# Patient Record
Sex: FEMALE | Race: WHITE | NOT HISPANIC OR LATINO
[De-identification: names, ages, dates, MRNs, and addresses within clinical notes are randomized per-mention and may not be internally consistent; named-entity substitution may affect disease eponyms.]

---

## 2020-12-24 ENCOUNTER — ASOB RESULT (OUTPATIENT)
Age: 30
End: 2020-12-24

## 2020-12-24 ENCOUNTER — APPOINTMENT (OUTPATIENT)
Dept: ANTEPARTUM | Facility: CLINIC | Age: 30
End: 2020-12-24
Payer: COMMERCIAL

## 2020-12-24 ENCOUNTER — LABORATORY RESULT (OUTPATIENT)
Age: 30
End: 2020-12-24

## 2020-12-24 PROCEDURE — 36416 COLLJ CAPILLARY BLOOD SPEC: CPT

## 2020-12-24 PROCEDURE — 99072 ADDL SUPL MATRL&STAF TM PHE: CPT

## 2020-12-24 PROCEDURE — 76813 OB US NUCHAL MEAS 1 GEST: CPT | Mod: 59

## 2020-12-24 PROCEDURE — 76801 OB US < 14 WKS SINGLE FETUS: CPT

## 2021-02-16 ENCOUNTER — APPOINTMENT (OUTPATIENT)
Dept: ANTEPARTUM | Facility: CLINIC | Age: 31
End: 2021-02-16
Payer: COMMERCIAL

## 2021-02-16 ENCOUNTER — ASOB RESULT (OUTPATIENT)
Age: 31
End: 2021-02-16

## 2021-02-16 PROCEDURE — 76811 OB US DETAILED SNGL FETUS: CPT

## 2021-02-16 PROCEDURE — 99072 ADDL SUPL MATRL&STAF TM PHE: CPT

## 2021-06-09 ENCOUNTER — ASOB RESULT (OUTPATIENT)
Age: 31
End: 2021-06-09

## 2021-06-09 ENCOUNTER — APPOINTMENT (OUTPATIENT)
Dept: ANTEPARTUM | Facility: CLINIC | Age: 31
End: 2021-06-09
Payer: COMMERCIAL

## 2021-06-09 PROCEDURE — 76816 OB US FOLLOW-UP PER FETUS: CPT

## 2021-06-09 PROCEDURE — 76819 FETAL BIOPHYS PROFIL W/O NST: CPT

## 2021-06-18 ENCOUNTER — APPOINTMENT (OUTPATIENT)
Dept: ANTEPARTUM | Facility: CLINIC | Age: 31
End: 2021-06-18
Payer: COMMERCIAL

## 2021-06-18 ENCOUNTER — ASOB RESULT (OUTPATIENT)
Age: 31
End: 2021-06-18

## 2021-06-18 PROCEDURE — 76819 FETAL BIOPHYS PROFIL W/O NST: CPT

## 2021-06-28 ENCOUNTER — APPOINTMENT (OUTPATIENT)
Dept: ANTEPARTUM | Facility: CLINIC | Age: 31
End: 2021-06-28
Payer: COMMERCIAL

## 2021-06-28 ENCOUNTER — ASOB RESULT (OUTPATIENT)
Age: 31
End: 2021-06-28

## 2021-06-28 PROCEDURE — 76816 OB US FOLLOW-UP PER FETUS: CPT

## 2021-07-05 ENCOUNTER — OUTPATIENT (OUTPATIENT)
Dept: OUTPATIENT SERVICES | Facility: HOSPITAL | Age: 31
LOS: 1 days | End: 2021-07-05
Payer: COMMERCIAL

## 2021-07-05 VITALS — HEART RATE: 80 BPM | OXYGEN SATURATION: 96 %

## 2021-07-05 VITALS — SYSTOLIC BLOOD PRESSURE: 119 MMHG | DIASTOLIC BLOOD PRESSURE: 72 MMHG

## 2021-07-05 DIAGNOSIS — Z3A.00 WEEKS OF GESTATION OF PREGNANCY NOT SPECIFIED: ICD-10-CM

## 2021-07-05 DIAGNOSIS — E04.1 NONTOXIC SINGLE THYROID NODULE: Chronic | ICD-10-CM

## 2021-07-05 DIAGNOSIS — O26.899 OTHER SPECIFIED PREGNANCY RELATED CONDITIONS, UNSPECIFIED TRIMESTER: ICD-10-CM

## 2021-07-05 DIAGNOSIS — Z98.890 OTHER SPECIFIED POSTPROCEDURAL STATES: Chronic | ICD-10-CM

## 2021-07-05 PROCEDURE — 59025 FETAL NON-STRESS TEST: CPT

## 2021-07-05 PROCEDURE — G0463: CPT

## 2021-07-05 PROCEDURE — 99214 OFFICE O/P EST MOD 30 MIN: CPT

## 2021-07-05 PROCEDURE — 59025 FETAL NON-STRESS TEST: CPT | Mod: 26

## 2021-07-05 NOTE — OB PROVIDER TRIAGE NOTE - NSOBPROVIDERNOTE_OBGYN_ALL_OB_FT
32yo  @ 39w 6d with no evidence of ruptured membranes 32yo  @ 39w 6d with no evidence of ruptured membranes  not in labor  BSUS: vtx BPP 8/8, MILANA 11.8  Discussed case with Dr Conde, plan is to discharge patient home  Labor precautions  Daily FKC  increase hydration  F/u in office scheduled to schedule IOL  Yu Isidro PA-C

## 2021-07-05 NOTE — OB PROVIDER TRIAGE NOTE - HISTORY OF PRESENT ILLNESS
32yo  @ 39w 6 d presents c/o feeling wet this am when she woke up at 7am.  She changed her underwear and again felt her underwear wet, denies any large gush or leakage down her leg.  Denies VB or contractions, has + FM    PNC: Dr agarwal  PNI: uncomplicated  PNL: GBS +    All: NKDA  Meds: PNV  PMHx: Denies  PSHx: neck cyst removed, nasal septoplasty  Socialhx: Denies x 3  OBhx: 2016  FT    7lbs 12 oz  GYNhx: Denies fibroids, ov cysts or STDs    T(C): 37.2 (21 @ 10:22), Max: 37.2 (21 @ 10:06)  HR: 80 (21 @ 10:25) (80 - 90)  BP: 119/72 (21 @ 10:22) (119/72 - 119/72)  RR: 18 (21 @ 10:22) (18 - 18)  SpO2: 98% (21 @ 10:25) (97% - 99%)    Gen: NAd  Abdomen: Gravid, NT  Ext: no calf tenderness    NST:   TOCO  VE: 1-2/50/-3  SSE: neg pooling, neg nitrazine, neg ferning  BSUS:  EFW: 3600    A/P 32yo  @ 39w 6 d no evidence of ruptured membranes or labor at this time  - NST/TOCO  - BSUS  D/W Dr Elton Isidro PA-C 30yo  @ 39w 6 d presents c/o feeling wet this am when she woke up at 7am.  She changed her underwear and again felt her underwear wet, denies any large gush or leakage down her leg.  Denies VB or contractions, has + FM    PNC: Dr agarwal  PNI: uncomplicated  PNL: GBS +    All: NKDA  Meds: PNV  PMHx: Denies  PSHx: neck cyst removed, nasal septoplasty  Socialhx: Denies x 3  OBhx: 2016  FT    7lbs 12 oz  GYNhx: Denies fibroids, ov cysts or STDs    T(C): 37.2 (21 @ 10:22), Max: 37.2 (21 @ 10:06)  HR: 80 (21 @ 10:25) (80 - 90)  BP: 119/72 (21 @ 10:22) (119/72 - 119/72)  RR: 18 (21 @ 10:22) (18 - 18)  SpO2: 98% (21 @ 10:25) (97% - 99%)    Gen: NAd  Abdomen: Gravid, NT  Ext: no calf tenderness    NST: 125 moderate variability + accels no decels  TOCO: occasional  VE: 1-2/50/-3  SSE: neg pooling, neg nitrazine, neg ferning  BSUS: vtx, BPP 8/8, MILANA 11.8  EFW: 3600    A/P 30yo  @ 39w 6 d no evidence of ruptured membranes or labor at this time  - NST/TOCO  - BSUS  D/W Dr Elton Isidro PA-C 30yo  @ 39w 6 d presents c/o feeling wet this am when she woke up at 7am.  She changed her underwear and again felt her underwear wet, denies any large gush or leakage down her leg.  Denies VB or contractions, has + FM    PNC: Dr agarwal  PNI: uncomplicated  PNL: GBS +    All: NKDA  Meds: PNV  PMHx: Denies  PSHx: neck cyst removed, nasal septoplasty  Socialhx: Denies x 3  OBhx: 2016  FT    7lbs 12 oz  GYNhx: Denies fibroids, ov cysts or STDs    T(C): 37.2 (21 @ 10:22), Max: 37.2 (21 @ 10:06)  HR: 80 (21 @ 10:25) (80 - 90)  BP: 119/72 (21 @ 10:22) (119/72 - 119/72)  RR: 18 (21 @ 10:22) (18 - 18)  SpO2: 98% (21 @ 10:25) (97% - 99%)    Gen: NAD  Abdomen: Gravid, NT  Ext: no calf tenderness    NST: 125 moderate variability + accels no decels  TOCO: occasional  VE: 1-2/50/-3  SSE: neg pooling, neg nitrazine, neg ferning  BSUS: vtx, BPP 8/8, MILANA 11.8  EFW: 3600    A/P 30yo  @ 39w 6 d no evidence of ruptured membranes or labor at this time  - NST/TOCO  - BSUS  D/W Dr Elton Isidro PA-C

## 2021-07-05 NOTE — OB PROVIDER TRIAGE NOTE - NS_PHYSICALALLNEG_OBGYN_ALL_OB
No changes to her original H&P. Patient's diagnosis was again reviewed. All risks, benefits, complications and prognosis were discussed. Informed consent was obtained.  
All other review of systems negative, except as noted in HPI

## 2021-07-07 ENCOUNTER — INPATIENT (INPATIENT)
Facility: HOSPITAL | Age: 31
LOS: 1 days | Discharge: HOME | End: 2021-07-09
Attending: OBSTETRICS & GYNECOLOGY | Admitting: OBSTETRICS & GYNECOLOGY
Payer: COMMERCIAL

## 2021-07-07 VITALS — OXYGEN SATURATION: 99 % | HEART RATE: 75 BPM

## 2021-07-07 DIAGNOSIS — Z3A.00 WEEKS OF GESTATION OF PREGNANCY NOT SPECIFIED: ICD-10-CM

## 2021-07-07 DIAGNOSIS — O26.899 OTHER SPECIFIED PREGNANCY RELATED CONDITIONS, UNSPECIFIED TRIMESTER: ICD-10-CM

## 2021-07-07 DIAGNOSIS — Z98.890 OTHER SPECIFIED POSTPROCEDURAL STATES: Chronic | ICD-10-CM

## 2021-07-07 DIAGNOSIS — E04.1 NONTOXIC SINGLE THYROID NODULE: Chronic | ICD-10-CM

## 2021-07-07 DIAGNOSIS — Z34.80 ENCOUNTER FOR SUPERVISION OF OTHER NORMAL PREGNANCY, UNSPECIFIED TRIMESTER: ICD-10-CM

## 2021-07-07 DIAGNOSIS — O42.90 PREMATURE RUPTURE OF MEMBRANES, UNSPECIFIED AS TO LENGTH OF TIME BETWEEN RUPTURE AND ONSET OF LABOR, UNSPECIFIED WEEKS OF GESTATION: ICD-10-CM

## 2021-07-07 LAB
AMNISURE ROM (RUPTURE OF MEMBRANES): POSITIVE
BASOPHILS # BLD AUTO: 0.04 K/UL — SIGNIFICANT CHANGE UP (ref 0–0.2)
BASOPHILS NFR BLD AUTO: 0.3 % — SIGNIFICANT CHANGE UP (ref 0–2)
COVID-19 SPIKE DOMAIN AB INTERP: NEGATIVE — SIGNIFICANT CHANGE UP
COVID-19 SPIKE DOMAIN ANTIBODY RESULT: 0.4 U/ML — SIGNIFICANT CHANGE UP
EOSINOPHIL # BLD AUTO: 0.09 K/UL — SIGNIFICANT CHANGE UP (ref 0–0.5)
EOSINOPHIL NFR BLD AUTO: 0.8 % — SIGNIFICANT CHANGE UP (ref 0–6)
HCT VFR BLD CALC: 37 % — SIGNIFICANT CHANGE UP (ref 34.5–45)
HGB BLD-MCNC: 11.7 G/DL — SIGNIFICANT CHANGE UP (ref 11.5–15.5)
IMM GRANULOCYTES NFR BLD AUTO: 1 % — SIGNIFICANT CHANGE UP (ref 0–1.5)
LYMPHOCYTES # BLD AUTO: 1.99 K/UL — SIGNIFICANT CHANGE UP (ref 1–3.3)
LYMPHOCYTES # BLD AUTO: 17.3 % — SIGNIFICANT CHANGE UP (ref 13–44)
MCHC RBC-ENTMCNC: 26 PG — LOW (ref 27–34)
MCHC RBC-ENTMCNC: 31.6 GM/DL — LOW (ref 32–36)
MCV RBC AUTO: 82.2 FL — SIGNIFICANT CHANGE UP (ref 80–100)
MONOCYTES # BLD AUTO: 0.89 K/UL — SIGNIFICANT CHANGE UP (ref 0–0.9)
MONOCYTES NFR BLD AUTO: 7.7 % — SIGNIFICANT CHANGE UP (ref 2–14)
NEUTROPHILS # BLD AUTO: 8.38 K/UL — HIGH (ref 1.8–7.4)
NEUTROPHILS NFR BLD AUTO: 72.9 % — SIGNIFICANT CHANGE UP (ref 43–77)
NRBC # BLD: 0 /100 WBCS — SIGNIFICANT CHANGE UP (ref 0–0)
PLATELET # BLD AUTO: 236 K/UL — SIGNIFICANT CHANGE UP (ref 150–400)
RBC # BLD: 4.5 M/UL — SIGNIFICANT CHANGE UP (ref 3.8–5.2)
RBC # FLD: 14 % — SIGNIFICANT CHANGE UP (ref 10.3–14.5)
SARS-COV-2 IGG+IGM SERPL QL IA: 0.4 U/ML — SIGNIFICANT CHANGE UP
SARS-COV-2 IGG+IGM SERPL QL IA: NEGATIVE — SIGNIFICANT CHANGE UP
SARS-COV-2 RNA SPEC QL NAA+PROBE: SIGNIFICANT CHANGE UP
T PALLIDUM AB TITR SER: NEGATIVE — SIGNIFICANT CHANGE UP
WBC # BLD: 11.51 K/UL — HIGH (ref 3.8–10.5)
WBC # FLD AUTO: 11.51 K/UL — HIGH (ref 3.8–10.5)

## 2021-07-07 RX ORDER — HYDROCORTISONE 1 %
1 OINTMENT (GRAM) TOPICAL EVERY 6 HOURS
Refills: 0 | Status: DISCONTINUED | OUTPATIENT
Start: 2021-07-07 | End: 2021-07-09

## 2021-07-07 RX ORDER — PRAMOXINE HYDROCHLORIDE 150 MG/15G
1 AEROSOL, FOAM RECTAL EVERY 4 HOURS
Refills: 0 | Status: DISCONTINUED | OUTPATIENT
Start: 2021-07-07 | End: 2021-07-09

## 2021-07-07 RX ORDER — BENZOCAINE 10 %
1 GEL (GRAM) MUCOUS MEMBRANE EVERY 6 HOURS
Refills: 0 | Status: DISCONTINUED | OUTPATIENT
Start: 2021-07-07 | End: 2021-07-09

## 2021-07-07 RX ORDER — AER TRAVELER 0.5 G/1
1 SOLUTION RECTAL; TOPICAL EVERY 4 HOURS
Refills: 0 | Status: DISCONTINUED | OUTPATIENT
Start: 2021-07-07 | End: 2021-07-09

## 2021-07-07 RX ORDER — SODIUM CHLORIDE 9 MG/ML
1000 INJECTION, SOLUTION INTRAVENOUS
Refills: 0 | Status: DISCONTINUED | OUTPATIENT
Start: 2021-07-07 | End: 2021-07-07

## 2021-07-07 RX ORDER — LANOLIN
1 OINTMENT (GRAM) TOPICAL EVERY 6 HOURS
Refills: 0 | Status: DISCONTINUED | OUTPATIENT
Start: 2021-07-07 | End: 2021-07-09

## 2021-07-07 RX ORDER — TETANUS TOXOID, REDUCED DIPHTHERIA TOXOID AND ACELLULAR PERTUSSIS VACCINE, ADSORBED 5; 2.5; 8; 8; 2.5 [IU]/.5ML; [IU]/.5ML; UG/.5ML; UG/.5ML; UG/.5ML
0.5 SUSPENSION INTRAMUSCULAR ONCE
Refills: 0 | Status: DISCONTINUED | OUTPATIENT
Start: 2021-07-07 | End: 2021-07-09

## 2021-07-07 RX ORDER — KETOROLAC TROMETHAMINE 30 MG/ML
30 SYRINGE (ML) INJECTION ONCE
Refills: 0 | Status: DISCONTINUED | OUTPATIENT
Start: 2021-07-07 | End: 2021-07-07

## 2021-07-07 RX ORDER — DIPHENHYDRAMINE HCL 50 MG
25 CAPSULE ORAL EVERY 6 HOURS
Refills: 0 | Status: DISCONTINUED | OUTPATIENT
Start: 2021-07-07 | End: 2021-07-09

## 2021-07-07 RX ORDER — OXYTOCIN 10 UNIT/ML
333.33 VIAL (ML) INJECTION
Qty: 20 | Refills: 0 | Status: DISCONTINUED | OUTPATIENT
Start: 2021-07-07 | End: 2021-07-07

## 2021-07-07 RX ORDER — AMPICILLIN TRIHYDRATE 250 MG
1 CAPSULE ORAL EVERY 4 HOURS
Refills: 0 | Status: DISCONTINUED | OUTPATIENT
Start: 2021-07-07 | End: 2021-07-07

## 2021-07-07 RX ORDER — OXYCODONE HYDROCHLORIDE 5 MG/1
5 TABLET ORAL
Refills: 0 | Status: DISCONTINUED | OUTPATIENT
Start: 2021-07-07 | End: 2021-07-09

## 2021-07-07 RX ORDER — MAGNESIUM HYDROXIDE 400 MG/1
30 TABLET, CHEWABLE ORAL
Refills: 0 | Status: DISCONTINUED | OUTPATIENT
Start: 2021-07-07 | End: 2021-07-09

## 2021-07-07 RX ORDER — IBUPROFEN 200 MG
600 TABLET ORAL EVERY 6 HOURS
Refills: 0 | Status: COMPLETED | OUTPATIENT
Start: 2021-07-07 | End: 2022-06-05

## 2021-07-07 RX ORDER — ACETAMINOPHEN 500 MG
975 TABLET ORAL
Refills: 0 | Status: DISCONTINUED | OUTPATIENT
Start: 2021-07-07 | End: 2021-07-09

## 2021-07-07 RX ORDER — SIMETHICONE 80 MG/1
80 TABLET, CHEWABLE ORAL EVERY 4 HOURS
Refills: 0 | Status: DISCONTINUED | OUTPATIENT
Start: 2021-07-07 | End: 2021-07-09

## 2021-07-07 RX ORDER — OXYTOCIN 10 UNIT/ML
333.33 VIAL (ML) INJECTION
Qty: 20 | Refills: 0 | Status: DISCONTINUED | OUTPATIENT
Start: 2021-07-07 | End: 2021-07-09

## 2021-07-07 RX ORDER — AMPICILLIN TRIHYDRATE 250 MG
2 CAPSULE ORAL ONCE
Refills: 0 | Status: COMPLETED | OUTPATIENT
Start: 2021-07-07 | End: 2021-07-07

## 2021-07-07 RX ORDER — CITRIC ACID/SODIUM CITRATE 300-500 MG
15 SOLUTION, ORAL ORAL EVERY 6 HOURS
Refills: 0 | Status: DISCONTINUED | OUTPATIENT
Start: 2021-07-07 | End: 2021-07-07

## 2021-07-07 RX ORDER — SODIUM CHLORIDE 9 MG/ML
3 INJECTION INTRAMUSCULAR; INTRAVENOUS; SUBCUTANEOUS EVERY 8 HOURS
Refills: 0 | Status: DISCONTINUED | OUTPATIENT
Start: 2021-07-07 | End: 2021-07-09

## 2021-07-07 RX ORDER — DIBUCAINE 1 %
1 OINTMENT (GRAM) RECTAL EVERY 6 HOURS
Refills: 0 | Status: DISCONTINUED | OUTPATIENT
Start: 2021-07-07 | End: 2021-07-09

## 2021-07-07 RX ORDER — OXYCODONE HYDROCHLORIDE 5 MG/1
5 TABLET ORAL ONCE
Refills: 0 | Status: DISCONTINUED | OUTPATIENT
Start: 2021-07-07 | End: 2021-07-09

## 2021-07-07 RX ADMIN — Medication 108 GRAM(S): at 10:58

## 2021-07-07 RX ADMIN — Medication 30 MILLIGRAM(S): at 22:48

## 2021-07-07 RX ADMIN — Medication 216 GRAM(S): at 06:48

## 2021-07-07 RX ADMIN — Medication 108 GRAM(S): at 19:20

## 2021-07-07 RX ADMIN — Medication 108 GRAM(S): at 14:48

## 2021-07-07 NOTE — OB PROVIDER H&P - PROBLEM SELECTOR PLAN 1
Admit  Routine Labs/ COVID PCR  cont efm/toco  PO cytotec induction  Ampicillin for GBS ppx.  Expectant .  d/w Dr. Nunez.  Miya Peres

## 2021-07-07 NOTE — OB PROVIDER DELIVERY SUMMARY - NSSELHIDDEN_OBGYN_ALL_OB_FT
[NS_DeliveryAttending1_OBGYN_ALL_OB_FT:[email or web address]=],[NS_DeliveryAssist1_OBGYN_ALL_OB_FT:[id number]=]

## 2021-07-07 NOTE — OB PROVIDER TRIAGE NOTE - HISTORY OF PRESENT ILLNESS
Admission H&P    Subjective  HPI: 32yo  40+1 presents for chief concern of leakage of fluid at 3am. Patient states she was asleep and awoke from sleep due to wetness. She had leaked clear fluid and had to change her underwear and bedsheets. However, she has not had any more leaking since then. Patient was seen in triage 2 days ago for r/o ROM, but was found not to be ruptured and thus discharged home with precautions.     +FM. -CTXs. -VB. Pt denies any other concerns.    GBS: positive  All: NKDA  Meds: PNV  PMHx: Denies  PSHx: thyroglossal duct cyst removed, nasal septoplasty  Socialhx: Denies t/e/d  OBhx: 2016  FT    7lbs 12 oz  GYNhx: Denies fibroids, ov cysts or STDs Admission H&P    Subjective  HPI: 30yo  40+1 presents for chief concern of leakage of fluid at 3am. Patient states she was asleep and awoke from sleep due to wetness. She had leaked clear fluid and had to change her underwear and bedsheets. However, she has not had any more leaking since then. Patient was seen in triage 2 days ago for r/o ROM, but was found not to be ruptured and thus discharged home with precautions.     +FM. -CTXs. -VB. Pt denies any other concerns.    EFW: 3770g  GBS: positive  All: NKDA  Meds: PNV  PMHx: Denies  PSHx: thyroglossal duct cyst removed, nasal septoplasty  Socialhx: Denies t/e/d  OBhx: 2016  FT    7lbs 12 oz  GYNhx: Denies fibroids, ov cysts or STDs

## 2021-07-07 NOTE — OB PROVIDER H&P - HISTORY OF PRESENT ILLNESS
Admission H&P    Subjective  HPI: 30yo  40+1 presents for chief concern of leakage of fluid at 3am. Patient states she was asleep and awoke from sleep due to wetness. She had leaked clear fluid and had to change her underwear and bedsheets. However, she has not had any more leaking since then. Patient was seen in triage 2 days ago for r/o ROM, but was found not to be ruptured and thus discharged home with precautions.     +FM. -CTXs. -VB. Pt denies any other concerns.    GBS: positive  All: NKDA  Meds: PNV  PMHx: Denies  PSHx: thyroglossal duct cyst removed, nasal septoplasty  Socialhx: Denies t/e/d  OBhx: 2016  FT    7lbs 12 oz  GYNhx: Denies fibroids, ov cysts or STDs

## 2021-07-07 NOTE — OB RN DELIVERY SUMMARY - NS_SEPSISRSKCALC_OBGYN_ALL_OB_FT
EOS calculated successfully. EOS Risk Factor: 0.5/1000 live births ([facility] national incidence); GA=40w1d; Temp=99.14; ROM=18.067; GBS='Positive'; Antibiotics='Broad spectrum antibiotics > 4 hrs prior to birth'

## 2021-07-07 NOTE — OB PROVIDER H&P - NSHPPHYSICALEXAM_GEN_ALL_CORE
ICU Vital Signs Last 24 Hrs  T(C): 36.6 (07 Jul 2021 06:34), Max: 36.9 (07 Jul 2021 04:28)  T(F): 97.9 (07 Jul 2021 06:34), Max: 98.42 (07 Jul 2021 04:28)  HR: 81 (07 Jul 2021 06:49) (70 - 89)  BP: 128/78 (07 Jul 2021 06:34) (112/77 - 128/78)  BP(mean): --  ABP: --  ABP(mean): --  RR: 18 (07 Jul 2021 06:34) (18 - 18)  SpO2: 100% (07 Jul 2021 06:49) (98% - 100%)  CV: RRR  Pulm: breathing comfortably on RA  Abd: gravid, nontender  Extr: moving all extremities with ease  – Spec: neg pooling, neg nitrazine, neg ferning, no bleeding  – VE: 2/50/-3  – FHT: baseline 120, mod variability, +accels, -decels  – [place]: irregular  – Sono: vertex, BPP 8/8, MILANA 10

## 2021-07-07 NOTE — OB PROVIDER TRIAGE NOTE - NSHPPHYSICALEXAM_GEN_ALL_CORE
Objective  Vital Signs Last 24 Hrs  T(C): 36.9 (07 Jul 2021 04:35), Max: 36.9 (07 Jul 2021 04:28)  T(F): 98.4 (07 Jul 2021 04:35), Max: 98.42 (07 Jul 2021 04:28)  HR: 79 (07 Jul 2021 05:35) (73 - 89)  BP: 112/77 (07 Jul 2021 04:35) (112/77 - 112/77)  RR: 18 (07 Jul 2021 04:35) (18 - 18)  SpO2: 99% (07 Jul 2021 05:35) (98% - 100%)    – PE:   CV: RRR  Pulm: breathing comfortably on RA  Abd: gravid, nontender  Extr: moving all extremities with ease  – Spec: neg pooling, neg nitrazine, neg ferning, no bleeding  – VE: 2/50/-3  – FHT: baseline 120, mod variability, +accels, -decels  – [place]: irregular  – Sono: vertex, BPP 8/8, MILANA 10

## 2021-07-07 NOTE — OB PROVIDER LABOR PROGRESS NOTE - ASSESSMENT
Have been following the patient all day.  PA notes appreciated.  IuPC replaced.  200 [place] units show adequate contraction.    Arrest of labor for 3 plus hours.     Discussed case with Dr. Goodson.  I recommend a c section for arrest of labor.  
Hold off pushng for now . will recheck in 20 minutes.

## 2021-07-07 NOTE — PRE-ANESTHESIA EVALUATION ADULT - NSANTHADDINFOFT_GEN_ALL_CORE
labor epidural explained to pt in detail;  risks include but not limited to HA, failure, nv injury.  All questions ansewred.

## 2021-07-07 NOTE — OB RN DELIVERY SUMMARY - NSSELHIDDEN_OBGYN_ALL_OB_FT
[NS_DeliveryAttending1_OBGYN_ALL_OB_FT:[email or web address]=],[NS_DeliveryAssist1_OBGYN_ALL_OB_FT:[id number]] [NS_DeliveryAttending1_OBGYN_ALL_OB_FT:[email or web address]=],[NS_DeliveryAssist1_OBGYN_ALL_OB_FT:[id number]=],[NS_DeliveryRN_OBGYN_ALL_OB_FT:[id number]=]

## 2021-07-07 NOTE — OB PROVIDER DELIVERY SUMMARY - DELIVERY COMPLICATIONS; SHOULDER DYSTOCIA
Right anterior shoulder was trapped laterally and moved to the right oblique in the pelvis before delivery.

## 2021-07-07 NOTE — OB PROVIDER TRIAGE NOTE - NSOBPROVIDERNOTE_OBGYN_ALL_OB_FT
Assessment  –  @40+1 presents for r/o ROM. Nitrazine indeterminate, ferning neg, pooling neg. MILANA wnl. No evidence of ROM. No prenatal issues. GBS positive.     Plan  - Patient stable for discharge home   - Return precautions discussed in detail: return to L&D if experiencing leakage of fluid, heavy vaginal bleeding, severely painful and regular contractions, or decreased fetal movement. Patient verbalized understanding. All questions answered thoroughly and to patient's satisfaction.   - Patient scheduled for routine prenatal visit and IOL planning on . Patient instructed to keep this appointment as scheduled.     d/w Dr. Enrique Colbert MD PGY1 Assessment  –  @40+1 presents for r/o ROM. Nitrazine indeterminate, ferning neg, pooling neg. MILANA wnl. No evidence of ROM. No prenatal issues. GBS positive.     Plan  - Will send amnisure to confirm whether patient has ruptured or not  - f/u results  - NST reactive  - BPP , MILANA wnl, no concerns    d/w Dr. Enrique Colbert MD PGY1 Assessment  –  @40+1 presents for r/o ROM. Nitrazine indeterminate, ferning neg, pooling neg. MILANA wnl. No evidence of ROM. No prenatal issues. GBS positive.     Plan  - Will send amnisure to confirm whether patient has ruptured or not  - f/u results  - NST reactive  - BPP , MILANA wnl, no concerns    d/w Dr. Enrique Colbert MD PGY1      R2 Re-Evaluation Note    Patient's Amnisure resulted positive. Given this result and patient is GBS @40+1, will admit patient for IOL 2/2 PROM@3am w/ PO cytotec.     d/w Dr. Enrique Colbert PGY2

## 2021-07-07 NOTE — OB PROVIDER DELIVERY SUMMARY - NSPROVIDERDELIVERYNOTE_OBGYN_ALL_OB_FT
Spontaneous vaginal delivery of liveborn infant male from LOP position. Head delivered easily. Infant had a nuchal cord x1 that was clamped and cut at the neck. Shoulder dystocia of Right anterior shoulder was present. Right anterior shoulder was moved to the right oblique pelvis, suprapubic pressure, Tiffanie and Buchanan procedures were performed and baby was delivered and handed to pediatrics for evaluation. Infant was suctioned. Meconium was present. Placenta was delivered intact with a 3 vessel cord. Fundal massage was given and uterine fundus was found to be firm. Vaginal exam revealed an intact cervix, vaginal walls and sulci. Patient had a first degree laceration of the perineum and introitus that was repaired with 3_0 vicryl suture. Excellent hemostasis was noted. Patient stable. Count was correct x2.

## 2021-07-07 NOTE — OB NEONATOLOGY/PEDIATRICIAN DELIVERY SUMMARY - NSPEDSNEONOTESA_OBGYN_ALL_OB_FT
40+0 week GA baby boy born to a 31 year old F3P1 mother via  with code 100 shoulder and nuchal. Mom is A+, labs negative, GBS positive treated with amp x 4. Maternal history significant for chemical pregnancy, thyroglossal and surgery and septoplasty. No significant prenatal history. SROM at 0300 (~19 hours prior to delivery), highest maternal temp 37.5 deg C  EOS 0.25. Shoulder called, nuchal reduced, transferred to preheated warmer, dried suctioned and stimulated, provided CPAP x 2 minutes, responded appropriately.  APGAR 7/8, Mom plans to breast and formula feed, agrees to hepatitis B vaccine, plans for circ. Dr. Cr present for delivery.

## 2021-07-07 NOTE — OB RN PATIENT PROFILE - PMH
Chemical pregnancy    No pertinent past medical history     (normal spontaneous vaginal delivery)

## 2021-07-08 RX ORDER — IBUPROFEN 200 MG
600 TABLET ORAL EVERY 6 HOURS
Refills: 0 | Status: DISCONTINUED | OUTPATIENT
Start: 2021-07-08 | End: 2021-07-09

## 2021-07-08 RX ADMIN — Medication 975 MILLIGRAM(S): at 21:14

## 2021-07-08 RX ADMIN — Medication 975 MILLIGRAM(S): at 02:53

## 2021-07-08 RX ADMIN — Medication 975 MILLIGRAM(S): at 21:51

## 2021-07-08 RX ADMIN — MAGNESIUM HYDROXIDE 30 MILLILITER(S): 400 TABLET, CHEWABLE ORAL at 21:15

## 2021-07-08 RX ADMIN — Medication 975 MILLIGRAM(S): at 08:38

## 2021-07-08 RX ADMIN — Medication 975 MILLIGRAM(S): at 03:23

## 2021-07-08 RX ADMIN — Medication 600 MILLIGRAM(S): at 23:41

## 2021-07-08 RX ADMIN — Medication 600 MILLIGRAM(S): at 12:40

## 2021-07-08 RX ADMIN — Medication 1 TABLET(S): at 12:08

## 2021-07-08 RX ADMIN — Medication 600 MILLIGRAM(S): at 12:08

## 2021-07-08 RX ADMIN — Medication 600 MILLIGRAM(S): at 18:05

## 2021-07-08 RX ADMIN — Medication 975 MILLIGRAM(S): at 15:40

## 2021-07-08 RX ADMIN — Medication 975 MILLIGRAM(S): at 09:10

## 2021-07-08 RX ADMIN — Medication 600 MILLIGRAM(S): at 18:35

## 2021-07-08 RX ADMIN — Medication 600 MILLIGRAM(S): at 06:19

## 2021-07-08 RX ADMIN — Medication 600 MILLIGRAM(S): at 06:49

## 2021-07-08 RX ADMIN — Medication 975 MILLIGRAM(S): at 15:09

## 2021-07-08 NOTE — PROGRESS NOTE ADULT - ATTENDING COMMENTS
Patient seen and examined, agree with above. Doing well. To stay until tomorrow.   Gen: AAOx3, NAD  Abd: Soft, NT, fundus firm  : mild lochia on peripad, mild labial edema, laceration intact  Ext: NT

## 2021-07-08 NOTE — PROGRESS NOTE ADULT - ASSESSMENT
A/P: 32yo PPD#1 s/p .   Patient is doing well postpartum.   - Pain well controlled, continue current pain regimen  - Increase ambulation, SCDs when not ambulating  - Continue regular diet    Ellen Benton, PGY1

## 2021-07-08 NOTE — PROGRESS NOTE ADULT - SUBJECTIVE AND OBJECTIVE BOX
OB Progress Note:  PPD#1    S: 32yo  PPD#1 s/p . Patient feels well. Pain is well controlled. She is tolerating a regular diet and passing flatus. She is voiding spontaneously, and ambulating without difficulty. Denies CP/SOB. Denies lightheadedness/dizziness. Denies N/V.    O:  Vitals:  Vital Signs Last 24 Hrs  T(C): 36.7 (2021 05:21), Max: 37.3 (2021 09:02)  T(F): 98 (2021 05:21), Max: 99.14 (2021 09:02)  HR: 73 (2021 05:21) (62 - 150)  BP: 118/69 (2021 05:21) (102/56 - 149/94)  BP(mean): 83 (2021 00:50) (79 - 85)  RR: 18 (2021 05:21) (16 - 18)  SpO2: 98% (2021 05:21) (96% - 100%)    MEDICATIONS  (STANDING):  acetaminophen   Tablet .. 975 milliGRAM(s) Oral <User Schedule>  diphtheria/tetanus/pertussis (acellular) Vaccine (ADAcel) 0.5 milliLiter(s) IntraMuscular once  ibuprofen  Tablet. 600 milliGRAM(s) Oral every 6 hours  oxytocin Infusion 333.333 milliUNIT(s)/Min (1000 mL/Hr) IV Continuous <Continuous>  prenatal multivitamin 1 Tablet(s) Oral daily  sodium chloride 0.9% lock flush 3 milliLiter(s) IV Push every 8 hours      Labs:  Blood type: A Positive  Rubella IgG: RPR: Negative                          11.7   11.51<H> >-----------< 236    (  @ 07:01 )             37.0                  Physical Exam:  General: NAD  Abdomen: soft, non-tender, non-distended, fundus firm  Vaginal: Lochia wnl  Extremities: No erythema/edema

## 2021-07-09 ENCOUNTER — TRANSCRIPTION ENCOUNTER (OUTPATIENT)
Age: 31
End: 2021-07-09

## 2021-07-09 VITALS
DIASTOLIC BLOOD PRESSURE: 74 MMHG | SYSTOLIC BLOOD PRESSURE: 111 MMHG | OXYGEN SATURATION: 98 % | RESPIRATION RATE: 18 BRPM | TEMPERATURE: 98 F | HEART RATE: 72 BPM

## 2021-07-09 PROCEDURE — 86769 SARS-COV-2 COVID-19 ANTIBODY: CPT

## 2021-07-09 PROCEDURE — G0463: CPT

## 2021-07-09 PROCEDURE — 86901 BLOOD TYPING SEROLOGIC RH(D): CPT

## 2021-07-09 PROCEDURE — 86850 RBC ANTIBODY SCREEN: CPT

## 2021-07-09 PROCEDURE — 59050 FETAL MONITOR W/REPORT: CPT

## 2021-07-09 PROCEDURE — 59025 FETAL NON-STRESS TEST: CPT

## 2021-07-09 PROCEDURE — U0003: CPT

## 2021-07-09 PROCEDURE — U0005: CPT

## 2021-07-09 PROCEDURE — 84112 EVAL AMNIOTIC FLUID PROTEIN: CPT

## 2021-07-09 PROCEDURE — 86780 TREPONEMA PALLIDUM: CPT

## 2021-07-09 PROCEDURE — 85025 COMPLETE CBC W/AUTO DIFF WBC: CPT

## 2021-07-09 PROCEDURE — 86900 BLOOD TYPING SEROLOGIC ABO: CPT

## 2021-07-09 RX ORDER — AER TRAVELER 0.5 G/1
1 SOLUTION RECTAL; TOPICAL
Qty: 0 | Refills: 0 | DISCHARGE
Start: 2021-07-09

## 2021-07-09 RX ORDER — BENZOCAINE 10 %
1 GEL (GRAM) MUCOUS MEMBRANE
Qty: 0 | Refills: 0 | DISCHARGE
Start: 2021-07-09

## 2021-07-09 RX ADMIN — Medication 600 MILLIGRAM(S): at 00:15

## 2021-07-09 RX ADMIN — Medication 600 MILLIGRAM(S): at 12:37

## 2021-07-09 RX ADMIN — Medication 1 TABLET(S): at 12:37

## 2021-07-09 RX ADMIN — Medication 600 MILLIGRAM(S): at 06:45

## 2021-07-09 RX ADMIN — Medication 975 MILLIGRAM(S): at 03:24

## 2021-07-09 RX ADMIN — Medication 975 MILLIGRAM(S): at 08:57

## 2021-07-09 RX ADMIN — Medication 975 MILLIGRAM(S): at 04:00

## 2021-07-09 RX ADMIN — Medication 600 MILLIGRAM(S): at 06:14

## 2021-07-09 NOTE — DISCHARGE NOTE OB - CARE PROVIDER_API CALL
Yvan Powers)  Obstetrics and Gynecology  [address] [place] [address], [place] [address]  Phone: [phone]  Fax: [phone]  Follow Up Time:

## 2021-07-09 NOTE — DISCHARGE NOTE OB - PATIENT PORTAL LINK FT
You can access the FollowMyHealth Patient Portal offered by [facility] by registering at the following website: [email or web address]. By joining [facility]’s FollowMyHealth portal, you will also be able to view your health information using other applications (apps) compatible with our system.

## 2021-07-09 NOTE — DISCHARGE NOTE OB - HOSPITAL COURSE
Patient was admitted for SROM and augmentation given. Proceeded to have a vaginal delivery c/b shoulder dystocia which was successfully resolved. She did well post partum meeting her milesstones including those regarding activity, pain management, and labs/vitals. She was stable for discharge on PPD#2.

## 2021-07-09 NOTE — PROGRESS NOTE ADULT - SUBJECTIVE AND OBJECTIVE BOX
OB Attg Note:   PPD 2    S: Patient doing well. Minimal lochia. Pain controlled.    O: Vital Signs Last 24 Hrs  T(C): 36.5 (2021 05:36), Max: 36.8 (2021 17:00)  T(F): 97.7 (2021 05:36), Max: 98.2 (2021 17:00)  HR: 72 (2021 05:36) (72 - 84)  BP: 111/74 (2021 05:36) (111/74 - 124/79)  BP(mean): --  RR: 18 (2021 05:36) (18 - 18)  SpO2: 98% (2021 05:36) (98% - 98%)    Gen: NAD  Abd: soft, NT, ND, fundus firm below umbilicus  Lochia: moderate  Ext: no tenderness    Labs:      A: 31y PPD#2 s/p  doing well.    Plan:  continue routine PP care  discharge planning    DEVON Tee

## 2021-07-09 NOTE — DISCHARGE NOTE OB - MEDICATION SUMMARY - MEDICATIONS TO TAKE
I will START or STAY ON the medications listed below when I get home from the hospital:    acetaminophen 325 mg oral tablet  -- 3 tab(s) by mouth every 6 hours  -- Indication: For Pain/cramping    ibuprofen 600 mg oral tablet  -- 1 tab(s) by mouth every 6 hours  -- Indication: For Pain/cramping    dibucaine 1% topical ointment  -- 1 application on skin every 4 hours, As needed, Perineal Discomfort  -- Indication: For Perineal discomfort    lanolin topical ointment  -- 1 application on skin every 6 hours, As needed, Sore Nipples  -- Indication: For Nipple pain    benzocaine 20% topical spray  -- 1 spray(s) on skin every 6 hours, As needed, for Perineal discomfort  -- Indication: For Perineal discomfort    witch hazel 50% topical pad  -- 1 application on skin every 4 hours, As needed, Perineal discomfort  -- Indication: For Perineal discomfort    Prenatal Multivitamins with Folic Acid 1 mg oral tablet  -- 1 tab(s) by mouth once a day  -- Indication: For Postpartum    docusate sodium 100 mg oral capsule  -- 1 cap(s) by mouth 2 times a day  -- Indication: For Prn consitpation

## 2021-07-09 NOTE — DISCHARGE NOTE OB - MATERIALS PROVIDED
[facility] Screening Program/Breastfeeding Mother’s Support Group Information/Guide to Postpartum Care/Breastfeeding Guide and Packet/Discharge Medication Information for Patients and Families Pocket Guide

## 2021-07-09 NOTE — DISCHARGE NOTE OB - CARE PLAN
Principal Discharge DX:	 (normal spontaneous vaginal delivery)  Goal:	Recovery  Assessment and plan of treatment:	Patient s/p vaginal delivery. Continue with routine post partum care including pelvic rest (no sex, tampons, douching, tub baths, etc) for 6 weeks.

## 2021-07-09 NOTE — DISCHARGE NOTE OB - PLAN OF CARE
Patient s/p vaginal delivery. Continue with routine post partum care including pelvic rest (no sex, tampons, douching, tub baths, etc) for 6 weeks. Recovery

## 2022-04-19 NOTE — OB RN TRIAGE NOTE - SUICIDE SCREENING QUESTION 1
You can access the FollowMyHealth Patient Portal offered by [facility] by registering at the following website: [email or web address]. By joining [facility]’s FollowMyHealth portal, you will also be able to view your health information using other applications (apps) compatible with our system. No

## 2023-07-12 ENCOUNTER — TRANSCRIPTION ENCOUNTER (OUTPATIENT)
Age: 33
End: 2023-07-12

## 2023-09-21 NOTE — OB PROVIDER H&P - NS_PRENATALLABSOURCEGBS36PN_OBGYN_ALL_OB
normal/ROM intact/normal gait/strength 5/5 bilateral upper extremities/strength 5/5 bilateral lower extremities negative positive

## 2023-11-30 PROBLEM — O02.81 INAPPROPRIATE CHANGE IN QUANTITATIVE HUMAN CHORIONIC GONADOTROPIN (HCG) IN EARLY PREGNANCY: Chronic | Status: ACTIVE | Noted: 2021-07-07

## 2024-02-21 ENCOUNTER — APPOINTMENT (OUTPATIENT)
Dept: OBGYN | Facility: CLINIC | Age: 34
End: 2024-02-21

## 2024-02-23 ENCOUNTER — APPOINTMENT (OUTPATIENT)
Dept: OBGYN | Facility: CLINIC | Age: 34
End: 2024-02-23
Payer: COMMERCIAL

## 2024-02-23 VITALS
BODY MASS INDEX: 20.4 KG/M2 | DIASTOLIC BLOOD PRESSURE: 75 MMHG | WEIGHT: 130 LBS | SYSTOLIC BLOOD PRESSURE: 109 MMHG | HEIGHT: 67 IN

## 2024-02-23 DIAGNOSIS — Z01.419 ENCOUNTER FOR GYNECOLOGICAL EXAMINATION (GENERAL) (ROUTINE) W/OUT ABNORMAL FINDINGS: ICD-10-CM

## 2024-02-23 PROCEDURE — 99395 PREV VISIT EST AGE 18-39: CPT

## 2024-02-23 RX ORDER — ETONOGESTREL AND ETHINYL ESTRADIOL 11.7; 2.7 MG/1; MG/1
0.12-0.015 INSERT, EXTENDED RELEASE VAGINAL
Qty: 1 | Refills: 3 | Status: ACTIVE | COMMUNITY
Start: 2024-02-23 | End: 1900-01-01

## 2024-02-23 NOTE — PHYSICAL EXAM
[Appropriately responsive] : appropriately responsive [Chaperone Present] : A chaperone was present in the examining room during all aspects of the physical examination [Alert] : alert [No Acute Distress] : no acute distress [No Lymphadenopathy] : no lymphadenopathy [Regular Rate Rhythm] : regular rate rhythm [No Murmurs] : no murmurs [Clear to Auscultation B/L] : clear to auscultation bilaterally [Soft] : soft [Non-tender] : non-tender [Non-distended] : non-distended [No HSM] : No HSM [No Lesions] : no lesions [No Mass] : no mass [Oriented x3] : oriented x3 [No Masses] : no breast masses were palpable [Examination Of The Breasts] : a normal appearance [Labia Minora] : normal [Labia Majora] : normal [Normal] : normal [Uterine Adnexae] : normal [FreeTextEntry1] : alisha

## 2024-02-23 NOTE — PLAN
[FreeTextEntry1] : Routine Gyn Exam: BSA, calcium, vitamin D and exercise d/w pt Pap / HPV Advised pt to see PCP and dermatologist annually  FHx Ca: Pt to schedule genetics consult and pelvic sonogram  Breast Tenderness: Rx given for b/l breast sonogram  Low Libido: R/B/A discussed for IUD Pt to c/w NuvaRing for now as she may desire to try to conceive soon. Rx reordered.  RTO for pelvic sono or PRN

## 2024-02-26 LAB — HPV HIGH+LOW RISK DNA PNL CVX: NOT DETECTED

## 2024-02-29 LAB — CYTOLOGY CVX/VAG DOC THIN PREP: NORMAL

## 2024-03-29 ENCOUNTER — APPOINTMENT (OUTPATIENT)
Dept: OBGYN | Facility: CLINIC | Age: 34
End: 2024-03-29
Payer: COMMERCIAL

## 2024-03-29 DIAGNOSIS — Z15.01 GENETIC SUSCEPTIBILITY TO MALIGNANT NEOPLASM OF BREAST: ICD-10-CM

## 2024-03-29 DIAGNOSIS — Z15.02 GENETIC SUSCEPTIBILITY TO MALIGNANT NEOPLASM OF OVARY: ICD-10-CM

## 2024-03-29 DIAGNOSIS — N64.4 MASTODYNIA: ICD-10-CM

## 2024-03-29 DIAGNOSIS — Z01.419 ENCOUNTER FOR GYNECOLOGICAL EXAMINATION (GENERAL) (ROUTINE) W/OUT ABNORMAL FINDINGS: ICD-10-CM

## 2024-03-29 PROCEDURE — 99214 OFFICE O/P EST MOD 30 MIN: CPT

## 2024-03-29 NOTE — COUNSELING
[Declined testing, information given] : Patient declined testing, information provided [Discussed how results of genetic test could impact her health/CA risk and direct medical management] : Discussed how results of genetic test could impact her health/CA risk and direct medical management [Discussed possible options for risk reducing medical and surgical management and increase surveillance] : Discussed possible options for risk reducing medical and surgical management and increase surveillance [Informed of the three different test results: Positive/Negative/Variant of undetermined significance] : Informed of the three different test results: Positive/Negative/Variant of undetermined significance [Discussed implication of results for patient's family/relatives] : Discussed implication of results for patient's family/relatives [Discussed limitations of test] : Discussed limitations of test [Discussed survival rate and referrals to specialists] : Discussed survival rate and referrals to specialists [Multiple genes tested] : Multiple genes tested [Discussed life insurance] : Discussed life insurance [Discussed coverage and expense of test] : Discussed coverage and expense of test

## 2024-03-29 NOTE — HISTORY OF PRESENT ILLNESS
[No Personal History of Cancer] : Patient does not have a personal history of cancer [Family History of Cancer] : family history of cancer [FreeTextEntry1] : 2024 TRINA CHENG 33-year-old female presents for genetic counseling. No personal hx of cancer. Of [place] and [place] ancestry. Two children (2 and 6 y/o) A&W.  Risk Factors: Maternal uncle dx'd w/ lymphoma at 63 PGF  of ca of unknown primaries PGM dx'd w/ breast and ovarian ca in her 40s  See scanned pedigree chart.

## 2024-03-29 NOTE — ASSESSMENT
[FreeTextEntry1] : Discussed options for patient if any of the testing is positive.  Disclosure to family. Discussed risks and limitations of multi-gene testing, including management of variances of unknown significance (VUS). Discussed insurance issues. Pt will consider testing.   41 mins including visit, review of laboratories, review of recent studies, test ordering.

## 2024-03-29 NOTE — SIGNATURES
[TextEntry] : This note was authored by Richard Escobar working as a scribe for Dr. Faisal Ge.   I, Dr. Faisal Ge, have reviewed the content of this note and confirm it is true and accurate. I personally performed the history and physical examination and made all the decisions. 03/29/2024

## 2024-04-05 ENCOUNTER — NON-APPOINTMENT (OUTPATIENT)
Age: 34
End: 2024-04-05

## 2024-04-10 ENCOUNTER — APPOINTMENT (OUTPATIENT)
Dept: OBGYN | Facility: CLINIC | Age: 34
End: 2024-04-10
Payer: COMMERCIAL

## 2024-04-10 ENCOUNTER — ASOB RESULT (OUTPATIENT)
Age: 34
End: 2024-04-10

## 2024-04-10 PROCEDURE — 76830 TRANSVAGINAL US NON-OB: CPT

## 2024-04-22 ENCOUNTER — NON-APPOINTMENT (OUTPATIENT)
Age: 34
End: 2024-04-22

## 2024-06-06 ENCOUNTER — NON-APPOINTMENT (OUTPATIENT)
Age: 34
End: 2024-06-06

## 2025-04-14 ENCOUNTER — APPOINTMENT (OUTPATIENT)
Dept: OBGYN | Facility: CLINIC | Age: 35
End: 2025-04-14

## 2025-04-22 ENCOUNTER — LABORATORY RESULT (OUTPATIENT)
Age: 35
End: 2025-04-22

## 2025-04-22 ENCOUNTER — APPOINTMENT (OUTPATIENT)
Age: 35
End: 2025-04-22
Payer: COMMERCIAL

## 2025-04-22 VITALS
RESPIRATION RATE: 14 BRPM | DIASTOLIC BLOOD PRESSURE: 68 MMHG | BODY MASS INDEX: 20.47 KG/M2 | OXYGEN SATURATION: 99 % | HEART RATE: 87 BPM | WEIGHT: 130.4 LBS | SYSTOLIC BLOOD PRESSURE: 107 MMHG | TEMPERATURE: 98.3 F | HEIGHT: 67 IN

## 2025-04-22 DIAGNOSIS — Z00.00 ENCOUNTER FOR GENERAL ADULT MEDICAL EXAMINATION W/OUT ABNORMAL FINDINGS: ICD-10-CM

## 2025-04-22 DIAGNOSIS — Z80.3 FAMILY HISTORY OF MALIGNANT NEOPLASM OF BREAST: ICD-10-CM

## 2025-04-22 DIAGNOSIS — Z80.41 FAMILY HISTORY OF MALIGNANT NEOPLASM OF OVARY: ICD-10-CM

## 2025-04-22 DIAGNOSIS — Z83.438 FAMILY HISTORY OF OTHER DISORDER OF LIPOPROTEIN METABOLISM AND OTHER LIPIDEMIA: ICD-10-CM

## 2025-04-22 PROCEDURE — 99204 OFFICE O/P NEW MOD 45 MIN: CPT

## 2025-04-22 PROCEDURE — 36415 COLL VENOUS BLD VENIPUNCTURE: CPT

## 2025-04-22 PROCEDURE — G2211 COMPLEX E/M VISIT ADD ON: CPT | Mod: NC

## 2025-04-28 LAB
ALBUMIN SERPL ELPH-MCNC: 4.6 G/DL
ALP BLD-CCNC: 56 U/L
ALT SERPL-CCNC: 19 U/L
ANION GAP SERPL CALC-SCNC: 15 MMOL/L
AST SERPL-CCNC: 21 U/L
BASOPHILS # BLD AUTO: 0.03 K/UL
BASOPHILS NFR BLD AUTO: 0.4 %
BILIRUB SERPL-MCNC: 0.6 MG/DL
BUN SERPL-MCNC: 10 MG/DL
CALCIUM SERPL-MCNC: 10.2 MG/DL
CHLORIDE SERPL-SCNC: 102 MMOL/L
CHOLEST SERPL-MCNC: 192 MG/DL
CO2 SERPL-SCNC: 21 MMOL/L
CREAT SERPL-MCNC: 0.73 MG/DL
EGFRCR SERPLBLD CKD-EPI 2021: 110 ML/MIN/1.73M2
EOSINOPHIL # BLD AUTO: 0.08 K/UL
EOSINOPHIL NFR BLD AUTO: 1 %
ESTIMATED AVERAGE GLUCOSE: 114 MG/DL
GLUCOSE SERPL-MCNC: 76 MG/DL
HBA1C MFR BLD HPLC: 5.6 %
HCT VFR BLD CALC: 37.8 %
HDLC SERPL-MCNC: 85 MG/DL
HGB BLD-MCNC: 11.6 G/DL
IMM GRANULOCYTES NFR BLD AUTO: 0.2 %
LDLC SERPL-MCNC: 92 MG/DL
LYMPHOCYTES # BLD AUTO: 2.36 K/UL
LYMPHOCYTES NFR BLD AUTO: 28.4 %
MAN DIFF?: NORMAL
MCHC RBC-ENTMCNC: 24.5 PG
MCHC RBC-ENTMCNC: 30.7 G/DL
MCV RBC AUTO: 79.9 FL
MONOCYTES # BLD AUTO: 0.49 K/UL
MONOCYTES NFR BLD AUTO: 5.9 %
NEUTROPHILS # BLD AUTO: 5.32 K/UL
NEUTROPHILS NFR BLD AUTO: 64.1 %
NONHDLC SERPL-MCNC: 107 MG/DL
PLATELET # BLD AUTO: 307 K/UL
POTASSIUM SERPL-SCNC: 4.6 MMOL/L
PROT SERPL-MCNC: 7 G/DL
RBC # BLD: 4.73 M/UL
RBC # FLD: 13.7 %
SODIUM SERPL-SCNC: 138 MMOL/L
TRIGL SERPL-MCNC: 82 MG/DL
TSH SERPL-ACNC: 1.08 UIU/ML
WBC # FLD AUTO: 8.3 K/UL

## 2025-06-30 ENCOUNTER — APPOINTMENT (OUTPATIENT)
Dept: OBGYN | Facility: CLINIC | Age: 35
End: 2025-06-30